# Patient Record
Sex: FEMALE | Race: WHITE
[De-identification: names, ages, dates, MRNs, and addresses within clinical notes are randomized per-mention and may not be internally consistent; named-entity substitution may affect disease eponyms.]

---

## 2020-07-18 ENCOUNTER — HOSPITAL ENCOUNTER (EMERGENCY)
Age: 40
Discharge: HOME | End: 2020-07-18
Payer: MEDICAID

## 2020-07-18 VITALS — HEART RATE: 74 BPM | DIASTOLIC BLOOD PRESSURE: 85 MMHG | OXYGEN SATURATION: 97 % | SYSTOLIC BLOOD PRESSURE: 142 MMHG

## 2020-07-18 VITALS — HEART RATE: 76 BPM | SYSTOLIC BLOOD PRESSURE: 122 MMHG | DIASTOLIC BLOOD PRESSURE: 79 MMHG | OXYGEN SATURATION: 96 %

## 2020-07-18 VITALS
HEART RATE: 74 BPM | RESPIRATION RATE: 18 BRPM | DIASTOLIC BLOOD PRESSURE: 85 MMHG | TEMPERATURE: 98.06 F | SYSTOLIC BLOOD PRESSURE: 142 MMHG | OXYGEN SATURATION: 97 %

## 2020-07-18 VITALS — DIASTOLIC BLOOD PRESSURE: 77 MMHG | HEART RATE: 68 BPM | OXYGEN SATURATION: 97 % | SYSTOLIC BLOOD PRESSURE: 119 MMHG

## 2020-07-18 VITALS — HEART RATE: 68 BPM | SYSTOLIC BLOOD PRESSURE: 113 MMHG | OXYGEN SATURATION: 97 % | DIASTOLIC BLOOD PRESSURE: 68 MMHG

## 2020-07-18 VITALS
RESPIRATION RATE: 18 BRPM | OXYGEN SATURATION: 97 % | DIASTOLIC BLOOD PRESSURE: 77 MMHG | HEART RATE: 75 BPM | TEMPERATURE: 98.06 F | SYSTOLIC BLOOD PRESSURE: 117 MMHG

## 2020-07-18 VITALS — OXYGEN SATURATION: 96 % | SYSTOLIC BLOOD PRESSURE: 123 MMHG | HEART RATE: 71 BPM | DIASTOLIC BLOOD PRESSURE: 82 MMHG

## 2020-07-18 VITALS — BODY MASS INDEX: 28.3 KG/M2

## 2020-07-18 DIAGNOSIS — R07.89: Primary | ICD-10-CM

## 2020-07-18 DIAGNOSIS — Z82.49: ICD-10-CM

## 2020-07-18 DIAGNOSIS — R03.0: ICD-10-CM

## 2020-07-18 DIAGNOSIS — E78.5: ICD-10-CM

## 2020-07-18 LAB
ANION GAP SERPL CALC-SCNC: 16.3 MEQ/L (ref 5–15)
BUN SERPL-MCNC: 12 MG/DL (ref 7–17)
CALCIUM SPEC-MCNC: 9.5 MG/DL (ref 8.4–10.2)
CHLORIDE SPEC-SCNC: 101 MMOL/L (ref 98–107)
CO2 SERPL-SCNC: 25 MMOL/L (ref 22–30)
CREAT BLD-SCNC: 0.6 MG/DL (ref 0.52–1.04)
CREATININE CLEARANCE ESTIMATED: 146 ML/MIN (ref 50–200)
ESTIMATED GLOMERULAR FILT RATE: 110 ML/MIN (ref 60–?)
GFR (AFRICAN AMERICAN): 133 ML/MIN (ref 60–?)
GLUCOSE: 112 MG/DL (ref 74–100)
HCT VFR BLD CALC: 42.4 % (ref 37–47)
HGB BLD-MCNC: 14.6 G/DL (ref 12.2–16.2)
MCHC RBC-ENTMCNC: 34.5 G/DL (ref 31.8–35.4)
MCV RBC: 91.6 FL (ref 81–99)
MEAN CORPUSCULAR HEMOGLOBIN: 31.6 PG (ref 27–31.2)
PLATELET # BLD: 267 K/MM3 (ref 142–424)
POTASSIUM: 4.3 MMOL/L (ref 3.5–5.1)
RBC # BLD AUTO: 4.63 M/MM3 (ref 4.2–5.4)
SODIUM SPEC-SCNC: 138 MMOL/L (ref 136–145)
TROPONIN I: < 0.01 NG/ML (ref 0–0.03)
TROPONIN I: < 0.01 NG/ML (ref 0–0.03)
WBC # BLD AUTO: 7.1 K/MM3 (ref 4.8–10.8)

## 2020-07-18 PROCEDURE — 99284 EMERGENCY DEPT VISIT MOD MDM: CPT

## 2020-07-18 PROCEDURE — 84484 ASSAY OF TROPONIN QUANT: CPT

## 2020-07-18 PROCEDURE — 93005 ELECTROCARDIOGRAM TRACING: CPT

## 2020-07-18 PROCEDURE — 85025 COMPLETE CBC W/AUTO DIFF WBC: CPT

## 2020-07-18 PROCEDURE — 71046 X-RAY EXAM CHEST 2 VIEWS: CPT

## 2020-07-18 PROCEDURE — 80048 BASIC METABOLIC PNL TOTAL CA: CPT

## 2020-08-05 ENCOUNTER — HOSPITAL ENCOUNTER (OUTPATIENT)
Age: 40
End: 2020-08-05
Payer: SELF-PAY

## 2020-08-05 ENCOUNTER — HOSPITAL ENCOUNTER (OUTPATIENT)
Age: 40
End: 2020-08-05
Payer: MEDICAID

## 2020-08-05 DIAGNOSIS — E78.5: ICD-10-CM

## 2020-08-05 DIAGNOSIS — R94.31: ICD-10-CM

## 2020-08-05 DIAGNOSIS — R42: ICD-10-CM

## 2020-08-05 DIAGNOSIS — R07.89: Primary | ICD-10-CM

## 2020-08-05 DIAGNOSIS — R07.89: ICD-10-CM

## 2020-08-05 DIAGNOSIS — R06.00: ICD-10-CM

## 2020-08-05 DIAGNOSIS — Z13.6: Primary | ICD-10-CM

## 2020-08-05 DIAGNOSIS — R51: ICD-10-CM

## 2020-08-05 PROCEDURE — 93306 TTE W/DOPPLER COMPLETE: CPT

## 2020-08-05 PROCEDURE — 93017 CV STRESS TEST TRACING ONLY: CPT

## 2020-08-05 PROCEDURE — 75571 CT HRT W/O DYE W/CA TEST: CPT

## 2020-08-13 ENCOUNTER — HOSPITAL ENCOUNTER (OUTPATIENT)
Age: 40
End: 2020-08-13
Payer: MEDICAID

## 2020-08-13 VITALS
SYSTOLIC BLOOD PRESSURE: 108 MMHG | RESPIRATION RATE: 16 BRPM | DIASTOLIC BLOOD PRESSURE: 70 MMHG | OXYGEN SATURATION: 97 % | HEART RATE: 65 BPM

## 2020-08-13 VITALS
HEART RATE: 68 BPM | RESPIRATION RATE: 16 BRPM | OXYGEN SATURATION: 97 % | SYSTOLIC BLOOD PRESSURE: 122 MMHG | DIASTOLIC BLOOD PRESSURE: 79 MMHG

## 2020-08-13 VITALS
OXYGEN SATURATION: 97 % | SYSTOLIC BLOOD PRESSURE: 105 MMHG | DIASTOLIC BLOOD PRESSURE: 66 MMHG | TEMPERATURE: 97.88 F | RESPIRATION RATE: 20 BRPM | HEART RATE: 71 BPM

## 2020-08-13 VITALS
DIASTOLIC BLOOD PRESSURE: 53 MMHG | RESPIRATION RATE: 16 BRPM | OXYGEN SATURATION: 94 % | SYSTOLIC BLOOD PRESSURE: 92 MMHG | HEART RATE: 67 BPM

## 2020-08-13 VITALS
HEART RATE: 67 BPM | RESPIRATION RATE: 16 BRPM | DIASTOLIC BLOOD PRESSURE: 57 MMHG | SYSTOLIC BLOOD PRESSURE: 98 MMHG | OXYGEN SATURATION: 96 %

## 2020-08-13 VITALS
OXYGEN SATURATION: 97 % | RESPIRATION RATE: 16 BRPM | SYSTOLIC BLOOD PRESSURE: 98 MMHG | DIASTOLIC BLOOD PRESSURE: 58 MMHG | HEART RATE: 52 BPM

## 2020-08-13 VITALS
SYSTOLIC BLOOD PRESSURE: 93 MMHG | HEART RATE: 67 BPM | OXYGEN SATURATION: 93 % | RESPIRATION RATE: 16 BRPM | DIASTOLIC BLOOD PRESSURE: 52 MMHG

## 2020-08-13 VITALS
OXYGEN SATURATION: 95 % | DIASTOLIC BLOOD PRESSURE: 52 MMHG | SYSTOLIC BLOOD PRESSURE: 89 MMHG | HEART RATE: 62 BPM | RESPIRATION RATE: 16 BRPM

## 2020-08-13 VITALS
RESPIRATION RATE: 16 BRPM | HEART RATE: 65 BPM | SYSTOLIC BLOOD PRESSURE: 92 MMHG | DIASTOLIC BLOOD PRESSURE: 54 MMHG | OXYGEN SATURATION: 98 %

## 2020-08-13 VITALS
RESPIRATION RATE: 16 BRPM | OXYGEN SATURATION: 98 % | DIASTOLIC BLOOD PRESSURE: 59 MMHG | SYSTOLIC BLOOD PRESSURE: 82 MMHG | HEART RATE: 70 BPM

## 2020-08-13 VITALS
RESPIRATION RATE: 16 BRPM | HEART RATE: 74 BPM | OXYGEN SATURATION: 94 % | DIASTOLIC BLOOD PRESSURE: 57 MMHG | SYSTOLIC BLOOD PRESSURE: 102 MMHG

## 2020-08-13 VITALS
HEART RATE: 68 BPM | OXYGEN SATURATION: 98 % | DIASTOLIC BLOOD PRESSURE: 76 MMHG | SYSTOLIC BLOOD PRESSURE: 120 MMHG | RESPIRATION RATE: 16 BRPM

## 2020-08-13 VITALS — BODY MASS INDEX: 29.2 KG/M2

## 2020-08-13 DIAGNOSIS — R94.39: ICD-10-CM

## 2020-08-13 DIAGNOSIS — Z79.52: ICD-10-CM

## 2020-08-13 DIAGNOSIS — E78.5: ICD-10-CM

## 2020-08-13 DIAGNOSIS — Z79.899: ICD-10-CM

## 2020-08-13 DIAGNOSIS — I10: ICD-10-CM

## 2020-08-13 DIAGNOSIS — I20.8: Primary | ICD-10-CM

## 2020-08-13 LAB
ANION GAP SERPL CALC-SCNC: 13.4 MEQ/L (ref 5–15)
BUN SERPL-MCNC: 13 MG/DL (ref 7–17)
CALCIUM SPEC-MCNC: 9.5 MG/DL (ref 8.4–10.2)
CHLORIDE SPEC-SCNC: 103 MMOL/L (ref 98–107)
CO2 SERPL-SCNC: 28 MMOL/L (ref 22–30)
CREAT BLD-SCNC: 0.7 MG/DL (ref 0.52–1.04)
CREATININE CLEARANCE ESTIMATED: 129 ML/MIN (ref 50–200)
ESTIMATED GLOMERULAR FILT RATE: 92 ML/MIN (ref 60–?)
GFR (AFRICAN AMERICAN): 112 ML/MIN (ref 60–?)
GLUCOSE: 102 MG/DL (ref 74–100)
HCT VFR BLD CALC: 39.8 % (ref 37–47)
HGB BLD-MCNC: 14 G/DL (ref 12.2–16.2)
MCHC RBC-ENTMCNC: 35.1 G/DL (ref 31.8–35.4)
MCV RBC: 89.4 FL (ref 81–99)
MEAN CORPUSCULAR HEMOGLOBIN: 31.4 PG (ref 27–31.2)
PLATELET # BLD: 275 K/MM3 (ref 142–424)
POTASSIUM: 4.4 MMOL/L (ref 3.5–5.1)
RBC # BLD AUTO: 4.45 M/MM3 (ref 4.2–5.4)
SODIUM SPEC-SCNC: 140 MMOL/L (ref 136–145)
WBC # BLD AUTO: 5.8 K/MM3 (ref 4.8–10.8)

## 2020-08-13 PROCEDURE — C1769 GUIDE WIRE: HCPCS

## 2020-08-13 PROCEDURE — 80048 BASIC METABOLIC PNL TOTAL CA: CPT

## 2020-08-13 PROCEDURE — 93458 L HRT ARTERY/VENTRICLE ANGIO: CPT

## 2020-08-13 PROCEDURE — 99152 MOD SED SAME PHYS/QHP 5/>YRS: CPT

## 2020-08-13 PROCEDURE — C1725 CATH, TRANSLUMIN NON-LASER: HCPCS

## 2020-08-13 PROCEDURE — 86328 IA NFCT AB SARSCOV2 COVID19: CPT

## 2020-08-13 PROCEDURE — 85025 COMPLETE CBC W/AUTO DIFF WBC: CPT

## 2020-08-19 ENCOUNTER — HOSPITAL ENCOUNTER (OUTPATIENT)
Age: 40
End: 2020-08-19
Payer: MEDICAID

## 2020-08-19 DIAGNOSIS — R42: ICD-10-CM

## 2020-08-19 DIAGNOSIS — I20.8: ICD-10-CM

## 2020-08-19 DIAGNOSIS — R07.89: ICD-10-CM

## 2020-08-19 DIAGNOSIS — R94.31: ICD-10-CM

## 2020-08-19 DIAGNOSIS — R06.00: ICD-10-CM

## 2020-08-19 DIAGNOSIS — I10: ICD-10-CM

## 2020-08-19 DIAGNOSIS — R94.39: Primary | ICD-10-CM

## 2020-08-19 DIAGNOSIS — E78.2: ICD-10-CM

## 2020-08-19 DIAGNOSIS — R51: ICD-10-CM

## 2020-08-19 PROCEDURE — 70470 CT HEAD/BRAIN W/O & W/DYE: CPT

## 2020-08-31 ENCOUNTER — HOSPITAL ENCOUNTER (OUTPATIENT)
Age: 40
End: 2020-08-31
Payer: MEDICAID

## 2020-08-31 DIAGNOSIS — R06.03: ICD-10-CM

## 2020-08-31 DIAGNOSIS — G47.33: Primary | ICD-10-CM

## 2020-08-31 DIAGNOSIS — R40.0: ICD-10-CM

## 2020-08-31 DIAGNOSIS — R53.83: ICD-10-CM

## 2020-08-31 PROCEDURE — G0399 HOME SLEEP TEST/TYPE 3 PORTA: HCPCS

## 2020-09-11 ENCOUNTER — HOSPITAL ENCOUNTER (OUTPATIENT)
Age: 40
End: 2020-09-11
Payer: MEDICAID

## 2020-09-11 DIAGNOSIS — R10.11: Primary | ICD-10-CM

## 2020-09-11 PROCEDURE — 76705 ECHO EXAM OF ABDOMEN: CPT

## 2020-09-18 ENCOUNTER — HOSPITAL ENCOUNTER (OUTPATIENT)
Dept: HOSPITAL 22 - OUTP | Age: 40
End: 2020-09-18
Payer: MEDICAID

## 2020-09-18 ENCOUNTER — ON CAMPUS - OUTPATIENT (OUTPATIENT)
Dept: RURAL HOSPITAL 6 | Facility: HOSPITAL | Age: 40
End: 2020-09-18

## 2020-09-18 ENCOUNTER — HOSPITAL ENCOUNTER (OUTPATIENT)
Age: 40
End: 2020-09-18
Payer: MEDICAID

## 2020-09-18 VITALS
OXYGEN SATURATION: 92 % | TEMPERATURE: 98.06 F | SYSTOLIC BLOOD PRESSURE: 106 MMHG | RESPIRATION RATE: 18 BRPM | HEART RATE: 98 BPM | DIASTOLIC BLOOD PRESSURE: 69 MMHG

## 2020-09-18 VITALS — OXYGEN SATURATION: 97 %

## 2020-09-18 VITALS
HEART RATE: 72 BPM | SYSTOLIC BLOOD PRESSURE: 106 MMHG | OXYGEN SATURATION: 96 % | DIASTOLIC BLOOD PRESSURE: 68 MMHG | RESPIRATION RATE: 18 BRPM

## 2020-09-18 VITALS
OXYGEN SATURATION: 98 % | HEART RATE: 73 BPM | DIASTOLIC BLOOD PRESSURE: 68 MMHG | RESPIRATION RATE: 18 BRPM | SYSTOLIC BLOOD PRESSURE: 106 MMHG

## 2020-09-18 VITALS — BODY MASS INDEX: 29.2 KG/M2

## 2020-09-18 VITALS
RESPIRATION RATE: 18 BRPM | HEART RATE: 61 BPM | SYSTOLIC BLOOD PRESSURE: 105 MMHG | OXYGEN SATURATION: 100 % | DIASTOLIC BLOOD PRESSURE: 56 MMHG

## 2020-09-18 VITALS
HEART RATE: 66 BPM | OXYGEN SATURATION: 98 % | TEMPERATURE: 98.3 F | RESPIRATION RATE: 16 BRPM | SYSTOLIC BLOOD PRESSURE: 119 MMHG | DIASTOLIC BLOOD PRESSURE: 69 MMHG

## 2020-09-18 DIAGNOSIS — K31.9: ICD-10-CM

## 2020-09-18 DIAGNOSIS — F32.9: ICD-10-CM

## 2020-09-18 DIAGNOSIS — Z79.82: ICD-10-CM

## 2020-09-18 DIAGNOSIS — Z01.84: Primary | ICD-10-CM

## 2020-09-18 DIAGNOSIS — I10: ICD-10-CM

## 2020-09-18 DIAGNOSIS — G47.33: ICD-10-CM

## 2020-09-18 DIAGNOSIS — K44.9: ICD-10-CM

## 2020-09-18 DIAGNOSIS — K21.9 GASTRO-ESOPHAGEAL REFLUX DISEASE WITHOUT ESOPHAGITIS: ICD-10-CM

## 2020-09-18 DIAGNOSIS — F41.9: ICD-10-CM

## 2020-09-18 DIAGNOSIS — Z79.899: ICD-10-CM

## 2020-09-18 DIAGNOSIS — K22.4 DYSKINESIA OF ESOPHAGUS: ICD-10-CM

## 2020-09-18 DIAGNOSIS — E78.5: ICD-10-CM

## 2020-09-18 DIAGNOSIS — K30 FUNCTIONAL DYSPEPSIA: ICD-10-CM

## 2020-09-18 DIAGNOSIS — K29.50 UNSPECIFIED CHRONIC GASTRITIS WITHOUT BLEEDING: ICD-10-CM

## 2020-09-18 DIAGNOSIS — K21.9: ICD-10-CM

## 2020-09-18 DIAGNOSIS — R12 HEARTBURN: ICD-10-CM

## 2020-09-18 DIAGNOSIS — K22.4: ICD-10-CM

## 2020-09-18 DIAGNOSIS — J39.2: Primary | ICD-10-CM

## 2020-09-18 PROCEDURE — 36415 COLL VENOUS BLD VENIPUNCTURE: CPT

## 2020-09-18 PROCEDURE — 0DJ08ZZ INSPECTION OF UPPER INTESTINAL TRACT, VIA NATURAL OR ARTIFICIAL OPENING ENDOSCOPIC: ICD-10-PCS | Performed by: INTERNAL MEDICINE

## 2020-09-18 PROCEDURE — 81025 URINE PREGNANCY TEST: CPT

## 2020-09-18 PROCEDURE — C1726 CATH, BAL DIL, NON-VASCULAR: HCPCS

## 2020-09-18 PROCEDURE — 43239 EGD BIOPSY SINGLE/MULTIPLE: CPT

## 2020-09-18 PROCEDURE — 86328 IA NFCT AB SARSCOV2 COVID19: CPT

## 2020-09-18 PROCEDURE — 43239 EGD BIOPSY SINGLE/MULTIPLE: CPT | Mod: 59 | Performed by: INTERNAL MEDICINE

## 2020-09-18 PROCEDURE — 43249 ESOPH EGD DILATION <30 MM: CPT

## 2020-09-18 PROCEDURE — 43249 ESOPH EGD DILATION <30 MM: CPT | Performed by: INTERNAL MEDICINE

## 2020-09-22 ENCOUNTER — HOSPITAL ENCOUNTER (OUTPATIENT)
Age: 40
End: 2020-09-22
Payer: MEDICAID

## 2020-09-22 DIAGNOSIS — R10.11: Primary | ICD-10-CM

## 2020-09-22 PROCEDURE — 78227 HEPATOBIL SYST IMAGE W/DRUG: CPT

## 2020-09-22 PROCEDURE — A9537 TC99M MEBROFENIN: HCPCS

## 2020-10-21 ENCOUNTER — OFFICE (OUTPATIENT)
Dept: URBAN - METROPOLITAN AREA CLINIC 4 | Facility: CLINIC | Age: 40
End: 2020-10-21

## 2020-10-21 VITALS
WEIGHT: 177 LBS | HEIGHT: 64 IN | DIASTOLIC BLOOD PRESSURE: 83 MMHG | TEMPERATURE: 98.1 F | SYSTOLIC BLOOD PRESSURE: 115 MMHG

## 2020-10-21 DIAGNOSIS — R19.4 CHANGE IN BOWEL HABIT: ICD-10-CM

## 2020-10-21 DIAGNOSIS — K59.00 CONSTIPATION, UNSPECIFIED: ICD-10-CM

## 2020-10-21 DIAGNOSIS — K30 FUNCTIONAL DYSPEPSIA: ICD-10-CM

## 2020-10-21 DIAGNOSIS — R14.0 ABDOMINAL DISTENSION (GASEOUS): ICD-10-CM

## 2020-10-21 DIAGNOSIS — R13.10 DYSPHAGIA, UNSPECIFIED: ICD-10-CM

## 2020-10-21 PROCEDURE — 99214 OFFICE O/P EST MOD 30 MIN: CPT | Performed by: NURSE PRACTITIONER

## 2020-11-09 ENCOUNTER — HOSPITAL ENCOUNTER (OUTPATIENT)
Age: 40
Discharge: HOME | End: 2020-11-09
Payer: MEDICAID

## 2020-11-09 ENCOUNTER — ON CAMPUS - OUTPATIENT (OUTPATIENT)
Dept: RURAL HOSPITAL 6 | Facility: HOSPITAL | Age: 40
End: 2020-11-09

## 2020-11-09 VITALS — OXYGEN SATURATION: 98 %

## 2020-11-09 VITALS
RESPIRATION RATE: 18 BRPM | SYSTOLIC BLOOD PRESSURE: 118 MMHG | TEMPERATURE: 97.5 F | HEART RATE: 68 BPM | OXYGEN SATURATION: 100 % | DIASTOLIC BLOOD PRESSURE: 74 MMHG

## 2020-11-09 VITALS
RESPIRATION RATE: 16 BRPM | SYSTOLIC BLOOD PRESSURE: 114 MMHG | OXYGEN SATURATION: 96 % | HEART RATE: 78 BPM | DIASTOLIC BLOOD PRESSURE: 78 MMHG | TEMPERATURE: 98.06 F

## 2020-11-09 VITALS
DIASTOLIC BLOOD PRESSURE: 59 MMHG | TEMPERATURE: 97.5 F | SYSTOLIC BLOOD PRESSURE: 103 MMHG | OXYGEN SATURATION: 99 % | RESPIRATION RATE: 18 BRPM | HEART RATE: 71 BPM

## 2020-11-09 VITALS
OXYGEN SATURATION: 98 % | HEART RATE: 78 BPM | DIASTOLIC BLOOD PRESSURE: 78 MMHG | SYSTOLIC BLOOD PRESSURE: 113 MMHG | RESPIRATION RATE: 20 BRPM | TEMPERATURE: 97.5 F

## 2020-11-09 VITALS
HEART RATE: 80 BPM | SYSTOLIC BLOOD PRESSURE: 104 MMHG | OXYGEN SATURATION: 93 % | TEMPERATURE: 97.5 F | DIASTOLIC BLOOD PRESSURE: 68 MMHG | RESPIRATION RATE: 18 BRPM

## 2020-11-09 VITALS
TEMPERATURE: 97.5 F | DIASTOLIC BLOOD PRESSURE: 85 MMHG | HEART RATE: 68 BPM | OXYGEN SATURATION: 98 % | RESPIRATION RATE: 18 BRPM | SYSTOLIC BLOOD PRESSURE: 125 MMHG

## 2020-11-09 VITALS — BODY MASS INDEX: 29.2 KG/M2

## 2020-11-09 DIAGNOSIS — R15.0 INCOMPLETE DEFECATION: ICD-10-CM

## 2020-11-09 DIAGNOSIS — I10: ICD-10-CM

## 2020-11-09 DIAGNOSIS — D12.4 BENIGN NEOPLASM OF DESCENDING COLON: ICD-10-CM

## 2020-11-09 DIAGNOSIS — F41.9: ICD-10-CM

## 2020-11-09 DIAGNOSIS — F32.9: ICD-10-CM

## 2020-11-09 DIAGNOSIS — Z79.899: ICD-10-CM

## 2020-11-09 DIAGNOSIS — K63.5: ICD-10-CM

## 2020-11-09 DIAGNOSIS — D12.2 BENIGN NEOPLASM OF ASCENDING COLON: ICD-10-CM

## 2020-11-09 DIAGNOSIS — R19.4 CHANGE IN BOWEL HABIT: ICD-10-CM

## 2020-11-09 DIAGNOSIS — K64.0: ICD-10-CM

## 2020-11-09 DIAGNOSIS — E78.5: ICD-10-CM

## 2020-11-09 DIAGNOSIS — K21.9: ICD-10-CM

## 2020-11-09 DIAGNOSIS — R14.0 ABDOMINAL DISTENSION (GASEOUS): ICD-10-CM

## 2020-11-09 DIAGNOSIS — K62.3: ICD-10-CM

## 2020-11-09 PROCEDURE — 86328 IA NFCT AB SARSCOV2 COVID19: CPT

## 2020-11-09 PROCEDURE — 45385 COLONOSCOPY W/LESION REMOVAL: CPT

## 2020-11-09 PROCEDURE — 0DJD8ZZ INSPECTION OF LOWER INTESTINAL TRACT, VIA NATURAL OR ARTIFICIAL OPENING ENDOSCOPIC: ICD-10-PCS | Performed by: INTERNAL MEDICINE

## 2020-11-09 PROCEDURE — 36415 COLL VENOUS BLD VENIPUNCTURE: CPT

## 2020-11-09 PROCEDURE — 45385 COLONOSCOPY W/LESION REMOVAL: CPT | Performed by: INTERNAL MEDICINE

## 2020-11-09 PROCEDURE — 81025 URINE PREGNANCY TEST: CPT

## 2023-01-25 ENCOUNTER — HOSPITAL ENCOUNTER (OUTPATIENT)
Age: 43
End: 2023-01-25
Payer: COMMERCIAL

## 2023-01-25 DIAGNOSIS — H49.10: ICD-10-CM

## 2023-01-25 DIAGNOSIS — H02.401: Primary | ICD-10-CM

## 2023-01-25 LAB — VITAMIN B12: 661 PG/ML (ref 239–931)

## 2023-01-25 PROCEDURE — 86255 FLUORESCENT ANTIBODY SCREEN: CPT

## 2023-01-25 PROCEDURE — 83519 RIA NONANTIBODY: CPT

## 2023-01-25 PROCEDURE — 85651 RBC SED RATE NONAUTOMATED: CPT

## 2023-01-25 PROCEDURE — 86593 SYPHILIS TEST NON-TREP QUANT: CPT

## 2023-01-25 PROCEDURE — 82164 ANGIOTENSIN I ENZYME TEST: CPT

## 2023-01-25 PROCEDURE — 36415 COLL VENOUS BLD VENIPUNCTURE: CPT

## 2023-01-25 PROCEDURE — 82607 VITAMIN B-12: CPT

## 2023-02-01 ENCOUNTER — OFFICE (OUTPATIENT)
Dept: URBAN - METROPOLITAN AREA PATHOLOGY 4 | Facility: PATHOLOGY | Age: 43
End: 2023-02-01

## 2023-02-01 ENCOUNTER — AMBULATORY SURGICAL CENTER (OUTPATIENT)
Dept: URBAN - METROPOLITAN AREA SURGERY 10 | Facility: SURGERY | Age: 43
End: 2023-02-01

## 2023-02-01 DIAGNOSIS — Z12.11 ENCOUNTER FOR SCREENING FOR MALIGNANT NEOPLASM OF COLON: ICD-10-CM

## 2023-02-01 DIAGNOSIS — Z86.010 PERSONAL HISTORY OF COLONIC POLYPS: ICD-10-CM

## 2023-02-01 DIAGNOSIS — D12.0 BENIGN NEOPLASM OF CECUM: ICD-10-CM

## 2023-02-01 DIAGNOSIS — D12.2 BENIGN NEOPLASM OF ASCENDING COLON: ICD-10-CM

## 2023-02-01 DIAGNOSIS — K64.1 SECOND DEGREE HEMORRHOIDS: ICD-10-CM

## 2023-02-01 PROCEDURE — 45385 COLONOSCOPY W/LESION REMOVAL: CPT | Mod: 33 | Performed by: INTERNAL MEDICINE

## 2023-02-01 PROCEDURE — 88305 TISSUE EXAM BY PATHOLOGIST: CPT | Performed by: INTERNAL MEDICINE

## 2023-02-02 PROBLEM — Z86.010 PERSONAL HISTORY OF COLON POLYPS: Status: ACTIVE | Noted: 2023-02-02

## 2023-02-02 PROBLEM — Z12.11 ENCOUNTER FOR COLONOSCOPY DUE TO HISTORY OF ADENOMATOUS COLONIC POLYPS: Status: ACTIVE | Noted: 2023-02-02

## 2023-02-06 LAB — MUSK AB SER IA-ACNC: <1

## 2023-02-15 ENCOUNTER — HOSPITAL ENCOUNTER (OUTPATIENT)
Age: 43
End: 2023-02-15
Payer: COMMERCIAL

## 2023-02-15 DIAGNOSIS — H02.401: Primary | ICD-10-CM

## 2023-02-15 DIAGNOSIS — J84.10: ICD-10-CM

## 2023-02-15 PROCEDURE — 71046 X-RAY EXAM CHEST 2 VIEWS: CPT

## 2023-02-15 PROCEDURE — 36415 COLL VENOUS BLD VENIPUNCTURE: CPT

## 2023-02-15 PROCEDURE — 87476 LYME DIS DNA AMP PROBE: CPT

## 2023-02-17 ENCOUNTER — HOSPITAL ENCOUNTER (OUTPATIENT)
Age: 43
End: 2023-02-17
Payer: COMMERCIAL

## 2023-02-17 DIAGNOSIS — Z12.31: Primary | ICD-10-CM

## 2023-02-17 PROCEDURE — 77063 BREAST TOMOSYNTHESIS BI: CPT

## 2023-02-17 PROCEDURE — 77067 SCR MAMMO BI INCL CAD: CPT

## 2023-03-01 ENCOUNTER — HOSPITAL ENCOUNTER (OUTPATIENT)
Age: 43
End: 2023-03-01
Payer: COMMERCIAL

## 2023-03-01 DIAGNOSIS — R92.8: Primary | ICD-10-CM

## 2023-03-01 PROCEDURE — 76641 ULTRASOUND BREAST COMPLETE: CPT

## 2023-03-01 PROCEDURE — G0279 TOMOSYNTHESIS, MAMMO: HCPCS

## 2023-03-01 PROCEDURE — 77065 DX MAMMO INCL CAD UNI: CPT

## 2023-03-01 PROCEDURE — 77061 BREAST TOMOSYNTHESIS UNI: CPT

## 2023-05-25 ENCOUNTER — HOSPITAL ENCOUNTER (OUTPATIENT)
Age: 43
End: 2023-05-25
Payer: COMMERCIAL

## 2023-05-25 VITALS — HEART RATE: 71 BPM

## 2023-05-25 DIAGNOSIS — R06.09: ICD-10-CM

## 2023-05-25 DIAGNOSIS — Z00.00: Primary | ICD-10-CM

## 2023-05-25 LAB
ALBUMIN LEVEL: 4.5 G/DL (ref 3.5–5)
ALBUMIN/GLOB SERPL: 1.8 {RATIO} (ref 1.1–1.8)
ALP ISO SERPL-ACNC: 54 U/L (ref 38–126)
ALT SERPLBLD-CCNC: 34 U/L (ref 12–78)
ANION GAP SERPL CALC-SCNC: 19.1 MEQ/L (ref 5–15)
AST SERPL QL: 34 U/L (ref 14–36)
BILIRUBIN,TOTAL: 0.3 MG/DL (ref 0.2–1.3)
BUN SERPL-MCNC: 13 MG/DL (ref 7–17)
CALCIUM SPEC-MCNC: 9.3 MG/DL (ref 8.4–10.2)
CHLORIDE SPEC-SCNC: 98 MMOL/L (ref 98–107)
CHOLEST SPEC-SCNC: 162 MG/DL (ref 140–200)
CO2 SERPL-SCNC: 24 MMOL/L (ref 22–30)
CREAT BLD-SCNC: 0.7 MG/DL (ref 0.52–1.04)
ESTIMATED GLOMERULAR FILT RATE: 91 ML/MIN (ref 60–?)
GFR (AFRICAN AMERICAN): 110 ML/MIN (ref 60–?)
GLOBULIN SER CALC-MCNC: 2.5 G/DL (ref 1.3–3.2)
GLUCOSE: 78 MG/DL (ref 74–100)
HBA1C MFR BLD: 5.2 % (ref 4–6)
HCT VFR BLD CALC: 37.7 % (ref 37–47)
HDLC SERPL-MCNC: 44 MG/DL (ref 40–60)
HGB BLD-MCNC: 12.6 G/DL (ref 12.2–16.2)
MCHC RBC-ENTMCNC: 33.3 G/DL (ref 31.8–35.4)
MCV RBC: 86.2 FL (ref 81–99)
MEAN CORPUSCULAR HEMOGLOBIN: 28.7 PG (ref 27–31.2)
PLATELET # BLD: 326 K/MM3 (ref 142–424)
POTASSIUM: 4.1 MMOL/L (ref 3.5–5.1)
PROT SERPL-MCNC: 7 G/DL (ref 6.3–8.2)
RBC # BLD AUTO: 4.38 M/MM3 (ref 4.2–5.4)
SODIUM SPEC-SCNC: 137 MMOL/L (ref 136–145)
TRIGLYCERIDES: 190 MG/DL (ref 30–150)
TSH SERPL-ACNC: 0.9 UIU/ML (ref 0.47–4.68)
WBC # BLD AUTO: 6.9 K/MM3 (ref 4.8–10.8)

## 2023-05-25 PROCEDURE — 83001 ASSAY OF GONADOTROPIN (FSH): CPT

## 2023-05-25 PROCEDURE — 80053 COMPREHEN METABOLIC PANEL: CPT

## 2023-05-25 PROCEDURE — 94640 AIRWAY INHALATION TREATMENT: CPT

## 2023-05-25 PROCEDURE — 82670 ASSAY OF TOTAL ESTRADIOL: CPT

## 2023-05-25 PROCEDURE — 82652 VIT D 1 25-DIHYDROXY: CPT

## 2023-05-25 PROCEDURE — 85025 COMPLETE CBC W/AUTO DIFF WBC: CPT

## 2023-05-25 PROCEDURE — 84443 ASSAY THYROID STIM HORMONE: CPT

## 2023-05-25 PROCEDURE — 95070 INHLJ BRNCL CHALLENGE TSTG: CPT

## 2023-05-25 PROCEDURE — 83036 HEMOGLOBIN GLYCOSYLATED A1C: CPT

## 2023-05-25 PROCEDURE — 84403 ASSAY OF TOTAL TESTOSTERONE: CPT

## 2023-05-25 PROCEDURE — 94070 EVALUATION OF WHEEZING: CPT

## 2023-05-25 PROCEDURE — 80061 LIPID PANEL: CPT

## 2023-05-25 PROCEDURE — 36415 COLL VENOUS BLD VENIPUNCTURE: CPT

## 2023-05-27 LAB — FSH: 3.1 MIU/ML

## 2023-06-04 LAB
1,25-DIHYDROXY, VITAMIN D-2: <10 PG/ML
1,25-DIHYDROXY, VITAMIN D-3: 37 PG/ML
VIT D25+D1,25 OH+D1,25 PNL SERPL-MCNC: 38 PG/ML

## 2023-06-07 LAB — TESTOSTERONE, TOTAL, LC/MS: 34 NG/DL

## 2023-07-28 ENCOUNTER — OFFICE VISIT (OUTPATIENT)
Dept: NEUROSURGERY | Facility: CLINIC | Age: 43
End: 2023-07-28
Payer: MEDICAID

## 2023-07-28 VITALS
DIASTOLIC BLOOD PRESSURE: 78 MMHG | SYSTOLIC BLOOD PRESSURE: 108 MMHG | BODY MASS INDEX: 29.16 KG/M2 | WEIGHT: 170.8 LBS | TEMPERATURE: 97.5 F | HEIGHT: 64 IN

## 2023-07-28 DIAGNOSIS — I67.1 CEREBRAL ANEURYSM, NONRUPTURED: Primary | ICD-10-CM

## 2023-07-28 PROCEDURE — 99204 OFFICE O/P NEW MOD 45 MIN: CPT | Performed by: NEUROLOGICAL SURGERY

## 2023-07-28 RX ORDER — TIZANIDINE 2 MG/1
TABLET ORAL
COMMUNITY

## 2023-07-28 RX ORDER — ALBUTEROL SULFATE 90 UG/1
AEROSOL, METERED RESPIRATORY (INHALATION)
COMMUNITY
Start: 2023-04-26

## 2023-07-28 RX ORDER — PLECANATIDE 3 MG/1
TABLET ORAL
COMMUNITY

## 2023-07-28 RX ORDER — VENLAFAXINE HYDROCHLORIDE 75 MG/1
75 CAPSULE, EXTENDED RELEASE ORAL DAILY
COMMUNITY

## 2023-07-28 RX ORDER — TRAZODONE HYDROCHLORIDE 150 MG/1
150 TABLET ORAL DAILY
COMMUNITY

## 2023-07-28 RX ORDER — BUDESONIDE AND FORMOTEROL FUMARATE DIHYDRATE 160; 4.5 UG/1; UG/1
AEROSOL RESPIRATORY (INHALATION)
COMMUNITY
Start: 2023-06-01

## 2023-07-28 NOTE — PROGRESS NOTES
NAME: CARLITA DYE   DOS: 2023  : 1980  PCP: Oli Banda MD    Chief Complaint:    Chief Complaint   Patient presents with    Cerebral Aneurysm       History of Present Illness:  43 y.o. female     I saw this 43-year-old female neurosurgical consultation she has a history of 2 interesting symptoms she has been to neuro-ophthalmology, neurology, and ENT regarding her symptoms    She presented with relatively insidious onset of some right eye lid cramping it is intermittent in nature she is a massage therapist and had a video that she showed me that shows some exaggerated ptosis it was difficult to ascertain during the visit    Her  was with her and says that really overall her facial symmetry appears stable and I looked at an old 's license and it looks relatively stable she does have a bit of facial asymmetry    She then went to the ENT they diagnosed her with a possible acoustic neuroma and/or possible labyrinthitis and have made a referral to Cj Hurt at  for further evaluation she is here in my clinic for evaluation of a questionable aneurysm she denies family history or ictus to suggest subarachnoid hemorrhage  PMHX  Allergies:  No Known Allergies  Medications    Current Outpatient Medications:     albuterol sulfate  (90 Base) MCG/ACT inhaler, INHALE 2 PUFFS INHALED EVERY 6 HOURS AS NEEDED FOR SHORTNESS OF BREATH OR WHEEZING, Disp: , Rfl:     budesonide-formoterol (SYMBICORT) 160-4.5 MCG/ACT inhaler, INHALE 1 PUFF INTO THE LUNGS TWICE A DAY, Disp: , Rfl:     tiZANidine (ZANAFLEX) 2 MG tablet, TAKE 1 TO 2 TABLETS BY MOUTH 1 HOUR BEFORE BEDTIME AS NEEDED FOR MUSCLE SPASMS, Disp: , Rfl:     traZODone (DESYREL) 150 MG tablet, Take 1 tablet by mouth Daily., Disp: , Rfl:     Trulance 3 MG tablet, , Disp: , Rfl:     venlafaxine XR (EFFEXOR-XR) 75 MG 24 hr capsule, Take 1 capsule by mouth Daily., Disp: , Rfl:   Past Medical History:  Past Medical History:   Diagnosis Date     Asthma     Headache      Past Surgical History:  No past surgical history on file.  Social Hx:  Social History     Tobacco Use    Smoking status: Never    Smokeless tobacco: Never   Substance Use Topics    Alcohol use: Not Currently    Drug use: Never     Family Hx:  No family history on file.  Review of Systems:        Review of Systems   Constitutional:  Negative for activity change, appetite change, chills, diaphoresis, fatigue, fever and unexpected weight change.   HENT:  Positive for ear pain and hearing loss. Negative for congestion, dental problem, drooling, ear discharge, facial swelling, mouth sores, nosebleeds, postnasal drip, rhinorrhea, sinus pressure, sinus pain, sneezing, sore throat, tinnitus, trouble swallowing and voice change.    Eyes:  Negative for photophobia, pain, discharge, redness, itching and visual disturbance.   Respiratory:  Positive for shortness of breath. Negative for apnea, cough, choking, chest tightness, wheezing and stridor.    Cardiovascular:  Negative for chest pain, palpitations and leg swelling.   Gastrointestinal:  Positive for constipation. Negative for abdominal distention, abdominal pain, anal bleeding, blood in stool, diarrhea, nausea, rectal pain and vomiting.   Endocrine: Negative for cold intolerance, heat intolerance, polydipsia, polyphagia and polyuria.   Genitourinary:  Negative for decreased urine volume, difficulty urinating, dyspareunia, dysuria, enuresis, flank pain, frequency, genital sores, hematuria, menstrual problem, pelvic pain, urgency, vaginal bleeding, vaginal discharge and vaginal pain.   Musculoskeletal:  Positive for neck pain. Negative for arthralgias, back pain, gait problem, joint swelling, myalgias and neck stiffness.   Skin:  Negative for color change, pallor, rash and wound.   Allergic/Immunologic: Negative for environmental allergies, food allergies and immunocompromised state.   Neurological:  Positive for headaches. Negative for dizziness,  tremors, seizures, syncope, facial asymmetry, speech difficulty, weakness, light-headedness and numbness.   Hematological:  Negative for adenopathy. Does not bruise/bleed easily.   Psychiatric/Behavioral:  Negative for agitation, behavioral problems, confusion, decreased concentration, dysphoric mood, hallucinations, self-injury, sleep disturbance and suicidal ideas. The patient is not nervous/anxious and is not hyperactive.     Tenderness present      Physical Examination:  Vitals:    07/28/23 1245   BP: 108/78   Temp: 97.5 °F (36.4 °C)      General Appearance:   Well developed, well nourished, well groomed, alert, and cooperative.  Neurological examination:  Neurologic Exam  Vital signs were reviewed and documented in the chart  Patient appeared in good neurologic function with normal comprehension fluent speech  Mood and affect are normal  Sense of smell deferred    Pupils symmetric equally reactive funduscopic exam not visualized   Visual fields intact to confrontation  Extraocular movements intact she has questionable asymmetry of the lid I detect a mild amount of ptosis and get the idea that either she had an old Bell's palsy or perhaps tarsal laxity  Face motor function is symmetric  Facial sensations normal  Hearing intact to finger rub hearing intact to finger rub on the left she has a she has a Garcia and Petty test that is indicative of likely sensorineural hearing issues  There is no rashes or other abnormality    Tongue is midline  Palate symmetric  Swallowing normal  Shoulder shrug normal    Muscle bulk and tone normal  5 out of 5 strength no motor drift  Gait normal intact  Negative Romberg  No clonus long tract signs or myelopathy    Reflexes symmetric  No edema noted and extremities skin appears normal    Straight leg raise sign absent  No signs of intrinsic hip dysfunction  Back is without any lesions or abnormality  Feet are warm and well perfused        Review of Imaging/DATA:  I personally  reviewed an MRA of the brain as well as the other imaging she has no evidence of high-grade impingement of the acoustic canal there is a slight amount of contrast-enhancement that almost appears physiologic but it could be indicative of enhancement of the seventh eighth complex there is no large vessels in the region certainly it is too small to consider this and acoustic    The MRA of the brain is unremarkable there is flow-voids in the vertebral and I see no evidence of significant aneurysm  Diagnoses/Plan:    Ms. Narayanan is a 43 y.o. female   1.  MRA of the neck to me appears within normal limits with some flow-voids and small potentially infundibula all seen as variants of normal I see no evidence of large aneurysms or abnormality and certainly nothing related to the symptoms that cause the work-up    2.  The acoustic canal appears widely patent there is a slight amount of contrast-enhancement I am curious as to whether or not potentially she had 1/7 8th nerve issue postviral syndrome that caused enhancement of the nerve with inflammatory response of the 7th nerve but it is just unusual to me that it would produce isolated lid problems and I suspect that these are 2 separate issues    I instructed her to keep her follow-up with Cj Hurt at [facility]NT he is an excellent neuro otologist/I do not think that this is hemifacial spasm.  Other etiologies discussed would include a ocular myasthenic issue etc. but I see no evidence of structural problems from a neurosurgical standpoint that I can correct    From a neurosurgical standpoint I do not have a lot to offer she certainly does not require surgery I try to give her a care pathway and some advice about how to manage her lid spasm and advised that if the symptoms persisted perhaps a referral back to an ocular therapist or neuro-ophthalmologist for revisitation would be reasonable

## 2024-02-06 ENCOUNTER — OFFICE (OUTPATIENT)
Dept: URBAN - METROPOLITAN AREA CLINIC 4 | Facility: CLINIC | Age: 44
End: 2024-02-06

## 2024-02-06 VITALS — DIASTOLIC BLOOD PRESSURE: 76 MMHG | WEIGHT: 179 LBS | SYSTOLIC BLOOD PRESSURE: 122 MMHG | HEIGHT: 64 IN

## 2024-02-06 DIAGNOSIS — R13.10 DYSPHAGIA, UNSPECIFIED: ICD-10-CM

## 2024-02-06 DIAGNOSIS — K59.09 OTHER CONSTIPATION: ICD-10-CM

## 2024-02-06 DIAGNOSIS — T17.308A UNSPECIFIED FOREIGN BODY IN LARYNX CAUSING OTHER INJURY, INI: ICD-10-CM

## 2024-02-06 DIAGNOSIS — K21.9 GASTRO-ESOPHAGEAL REFLUX DISEASE WITHOUT ESOPHAGITIS: ICD-10-CM

## 2024-02-06 PROCEDURE — 99214 OFFICE O/P EST MOD 30 MIN: CPT | Performed by: NURSE PRACTITIONER

## 2024-02-06 RX ORDER — PLECANATIDE 3 MG/1
3 TABLET ORAL
Qty: 90 | Refills: 1 | Status: ACTIVE
Start: 2024-02-06

## 2024-02-14 ENCOUNTER — AMBULATORY SURGICAL CENTER (OUTPATIENT)
Dept: URBAN - METROPOLITAN AREA SURGERY 10 | Facility: SURGERY | Age: 44
End: 2024-02-14

## 2024-02-14 ENCOUNTER — OFFICE (OUTPATIENT)
Dept: URBAN - METROPOLITAN AREA PATHOLOGY 4 | Facility: PATHOLOGY | Age: 44
End: 2024-02-14

## 2024-02-14 DIAGNOSIS — R13.10 DYSPHAGIA, UNSPECIFIED: ICD-10-CM

## 2024-02-14 DIAGNOSIS — K22.4 DYSKINESIA OF ESOPHAGUS: ICD-10-CM

## 2024-02-14 DIAGNOSIS — K21.00 GASTRO-ESOPHAGEAL REFLUX DISEASE WITH ESOPHAGITIS, WITHOUT B: ICD-10-CM

## 2024-02-14 DIAGNOSIS — K31.89 OTHER DISEASES OF STOMACH AND DUODENUM: ICD-10-CM

## 2024-02-14 PROCEDURE — 43239 EGD BIOPSY SINGLE/MULTIPLE: CPT | Mod: 59 | Performed by: INTERNAL MEDICINE

## 2024-02-14 PROCEDURE — 43249 ESOPH EGD DILATION <30 MM: CPT | Performed by: INTERNAL MEDICINE

## 2024-02-14 PROCEDURE — 88305 TISSUE EXAM BY PATHOLOGIST: CPT | Performed by: PATHOLOGY

## 2024-03-05 ENCOUNTER — HOSPITAL ENCOUNTER (OUTPATIENT)
Dept: HOSPITAL 22 - RAD | Age: 44
End: 2024-03-05
Payer: MEDICAID

## 2024-03-05 DIAGNOSIS — R91.1: ICD-10-CM

## 2024-03-05 DIAGNOSIS — R91.8: ICD-10-CM

## 2024-03-05 DIAGNOSIS — R59.0: Primary | ICD-10-CM

## 2024-03-05 PROCEDURE — 71260 CT THORAX DX C+: CPT

## 2024-03-05 RX ADMIN — IOPAMIDOL 75 ML: 755 INJECTION, SOLUTION INTRAVENOUS at 10:01

## 2024-03-05 NOTE — CT_ITS
FINAL REPORT 
 
CLINICAL HISTORY: 
F/U lung nodules 
 
COMPARISON: 
None 
 
FINDINGS: 
Axial CT images of the chest were obtained with contrast. 
Coronal and sagittal reformatted images were also obtained. This 
study was performed with techniques to keep radiation doses as 
low as reasonably achievable, (ALARA). Individualized dose 
reduction techniques using automated exposure control or 
adjustment of mA and/or KV according to the patient's size were 
employed.  Calcified mediastinal and right hilar nodes are 
present.  No axillary mass or adenopathy is identified.  Small 
bilateral pleural effusions are present.  Several calcified 
granulomas are present, the largest in the posterior right upper 
lobe.  No noncalcified nodules are seen.  No localized pulmonary 
inflammatory process is identified.  Limited images of the upper 
abdomen reveal no mass or localized inflammatory process. 
 
IMPRESSION: 
Small bilateral pleural effusions.  No abnormal nodules or 
masses are seen. 
 
Reviewed, Interpreted and Dictated by Keith Bernardo III, MD 
Transcribed by Shavon Jimenez 
 
Authenticated and Electronically Signed by Keith Bernardo III, MD on 03/05/2024 02:35:53 PM [facility]

## 2024-03-12 ENCOUNTER — HOSPITAL ENCOUNTER (OUTPATIENT)
Dept: HOSPITAL 22 - LAB | Age: 44
End: 2024-03-12
Payer: MEDICAID

## 2024-03-12 DIAGNOSIS — J30.9: Primary | ICD-10-CM

## 2024-03-12 LAB
HCT VFR BLD CALC: 40.2 % (ref 37–47)
HGB BLD-MCNC: 13.2 G/DL (ref 12.2–16.2)
MCHC RBC-ENTMCNC: 32.9 G/DL (ref 31.8–35.4)
MCV RBC: 86.2 FL (ref 81–99)
MEAN CORPUSCULAR HEMOGLOBIN: 28.4 PG (ref 27–31.2)
PLATELET # BLD: 292 K/MM3 (ref 142–424)
RBC # BLD AUTO: 4.66 M/MM3 (ref 4.2–5.4)
WBC # BLD AUTO: 6.1 K/MM3 (ref 4.8–10.8)

## 2024-03-12 PROCEDURE — 86003 ALLG SPEC IGE CRUDE XTRC EA: CPT

## 2024-03-12 PROCEDURE — 82785 ASSAY OF IGE: CPT

## 2024-03-12 PROCEDURE — 36415 COLL VENOUS BLD VENIPUNCTURE: CPT

## 2024-03-12 PROCEDURE — 85025 COMPLETE CBC W/AUTO DIFF WBC: CPT

## 2024-03-18 LAB
I006-IGE COCKROACH, GERMAN: <0.1 KU/L
T006-IGE CEDAR, MOUNTAIN: <0.1 KU/L
T007-IGE OAK, WHITE: <0.1 KU/L
T008-IGE ELM, AMERICAN: <0.1 KU/L
T015-IGE ASH, WHITE: <0.1 KU/L
T022-IGE PECAN, HICKORY: <0.1 KU/L
W001-IGE RAGWEED, SHORT: <0.1 KU/L
W011-IGE THISTLE, RUSSIAN: <0.1 KU/L
W014-IGE PIGWEED, COMMON: <0.1 KU/L

## 2024-08-22 ENCOUNTER — HOSPITAL ENCOUNTER (OUTPATIENT)
Dept: HOSPITAL 22 - RT | Age: 44
Discharge: HOME | End: 2024-08-22
Payer: MEDICAID

## 2024-08-22 DIAGNOSIS — R06.09: Primary | ICD-10-CM

## 2024-08-22 PROCEDURE — 94060 EVALUATION OF WHEEZING: CPT

## 2024-08-22 PROCEDURE — 94729 DIFFUSING CAPACITY: CPT

## 2024-08-22 PROCEDURE — 94618 PULMONARY STRESS TESTING: CPT

## 2024-08-22 PROCEDURE — 94726 PLETHYSMOGRAPHY LUNG VOLUMES: CPT

## 2024-08-22 RX ADMIN — ALBUTEROL SULFATE 2.5 MG: 2.5 SOLUTION RESPIRATORY (INHALATION) at 15:11

## 2025-03-17 ENCOUNTER — HOSPITAL ENCOUNTER (OUTPATIENT)
Dept: HOSPITAL 22 - LAB.DROPOF | Age: 45
Discharge: HOME | End: 2025-03-17
Payer: COMMERCIAL

## 2025-03-17 DIAGNOSIS — Z20.828: ICD-10-CM

## 2025-03-17 DIAGNOSIS — J06.9: Primary | ICD-10-CM

## 2025-03-17 PROCEDURE — 87636 SARSCOV2 & INF A&B AMP PRB: CPT
